# Patient Record
Sex: FEMALE | Race: WHITE
[De-identification: names, ages, dates, MRNs, and addresses within clinical notes are randomized per-mention and may not be internally consistent; named-entity substitution may affect disease eponyms.]

---

## 2017-12-21 ENCOUNTER — HOSPITAL ENCOUNTER (OUTPATIENT)
Dept: HOSPITAL 19 - MC.RAD | Age: 52
End: 2017-12-21
Payer: COMMERCIAL

## 2017-12-21 DIAGNOSIS — Z12.31: Primary | ICD-10-CM

## 2018-12-24 ENCOUNTER — HOSPITAL ENCOUNTER (OUTPATIENT)
Dept: HOSPITAL 19 - MC.RAD | Age: 53
End: 2018-12-24
Attending: OBSTETRICS & GYNECOLOGY
Payer: COMMERCIAL

## 2018-12-24 DIAGNOSIS — Z12.31: Primary | ICD-10-CM

## 2020-01-16 ENCOUNTER — HOSPITAL ENCOUNTER (OUTPATIENT)
Dept: HOSPITAL 19 - MC.RAD | Age: 55
End: 2020-01-16
Attending: FAMILY MEDICINE
Payer: COMMERCIAL

## 2020-01-16 DIAGNOSIS — Z12.31: Primary | ICD-10-CM

## 2021-01-22 ENCOUNTER — HOSPITAL ENCOUNTER (OUTPATIENT)
Dept: HOSPITAL 19 - MC.RAD | Age: 56
End: 2021-01-22
Attending: OBSTETRICS & GYNECOLOGY
Payer: COMMERCIAL

## 2021-01-22 DIAGNOSIS — Z12.31: Primary | ICD-10-CM

## 2021-10-15 ENCOUNTER — HOSPITAL ENCOUNTER (OUTPATIENT)
Dept: HOSPITAL 19 - SDCO | Age: 56
Discharge: HOME | End: 2021-10-15
Attending: SPECIALIST
Payer: COMMERCIAL

## 2021-10-15 VITALS — WEIGHT: 127.43 LBS | BODY MASS INDEX: 20.48 KG/M2 | HEIGHT: 66 IN

## 2021-10-15 VITALS — DIASTOLIC BLOOD PRESSURE: 77 MMHG | SYSTOLIC BLOOD PRESSURE: 121 MMHG | TEMPERATURE: 96.4 F | HEART RATE: 71 BPM

## 2021-10-15 VITALS — TEMPERATURE: 97 F | HEART RATE: 67 BPM | SYSTOLIC BLOOD PRESSURE: 110 MMHG | DIASTOLIC BLOOD PRESSURE: 76 MMHG

## 2021-10-15 DIAGNOSIS — Z86.010: ICD-10-CM

## 2021-10-15 DIAGNOSIS — Z12.11: Primary | ICD-10-CM

## 2021-10-15 NOTE — NUR
Patient expressed desire to be discharged. Criteria has been met. Discharge
instructions reviewed at this time, patient verbalized understanding and
signed the related paperwork. Vitals obtained. IV discontinued. Will continue
to monitor until ride arrives at front entrence. Call bell is within reach.

## 2021-10-15 NOTE — NUR
Patient escorted via wheelchair to front entrence by TERESA Mcadams at this time.
Patient has belongings and discharge instructions; states no further
questions.  met us at [facility], who is driving. Patient
transferred into his care.

## 2021-10-15 NOTE — NUR
Patient is alert and oriented x3. Requested water without ice and plain
toast, is tolerating it well. Vitals obtained. Will continue to monitor. Call
bell is within reach.

## 2021-10-15 NOTE — NUR
Patient arrived on cart, report obtained from Randa. Patient ambulated from
cart to recliner without difficulty, her gait was mildly unsteady. Vitals
obtained. Warm blanket given. Patient refused drink/snack. Will continue to
monitor. Patient has callbell and verbalized understanding.

## 2021-11-08 ENCOUNTER — HOSPITAL ENCOUNTER (EMERGENCY)
Dept: HOSPITAL 19 - COL.ER | Age: 56
Discharge: HOME | End: 2021-11-08
Attending: STUDENT IN AN ORGANIZED HEALTH CARE EDUCATION/TRAINING PROGRAM
Payer: COMMERCIAL

## 2021-11-08 VITALS — HEART RATE: 84 BPM | TEMPERATURE: 97.9 F | SYSTOLIC BLOOD PRESSURE: 124 MMHG | DIASTOLIC BLOOD PRESSURE: 93 MMHG

## 2021-11-08 VITALS — BODY MASS INDEX: 19.81 KG/M2 | WEIGHT: 123.24 LBS | HEIGHT: 65.98 IN
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
## 2021-11-08 DIAGNOSIS — S29.9XXA: Primary | ICD-10-CM

## 2021-11-08 PROCEDURE — A9284 NON-ELECTRONIC SPIROMETER: HCPCS

## 2022-02-21 ENCOUNTER — HOSPITAL ENCOUNTER (OUTPATIENT)
Dept: HOSPITAL 19 - MC.RAD | Age: 57
End: 2022-02-21
Attending: OBSTETRICS & GYNECOLOGY
Payer: COMMERCIAL

## 2022-02-21 DIAGNOSIS — Z12.31: Primary | ICD-10-CM

## 2022-02-21 DIAGNOSIS — N64.89: ICD-10-CM

## 2022-02-25 ENCOUNTER — HOSPITAL ENCOUNTER (OUTPATIENT)
Dept: HOSPITAL 19 - MC.RAD | Age: 57
End: 2022-02-25
Attending: OBSTETRICS & GYNECOLOGY
Payer: COMMERCIAL

## 2022-02-25 DIAGNOSIS — Z12.31: Primary | ICD-10-CM

## 2024-05-29 ENCOUNTER — HOSPITAL ENCOUNTER (OUTPATIENT)
Dept: HOSPITAL 19 - MC.RAD | Age: 59
End: 2024-05-29
Attending: FAMILY MEDICINE
Payer: COMMERCIAL

## 2024-05-29 DIAGNOSIS — Z12.31: Primary | ICD-10-CM
